# Patient Record
Sex: FEMALE | Race: BLACK OR AFRICAN AMERICAN | Employment: UNEMPLOYED | URBAN - METROPOLITAN AREA
[De-identification: names, ages, dates, MRNs, and addresses within clinical notes are randomized per-mention and may not be internally consistent; named-entity substitution may affect disease eponyms.]

---

## 2018-07-10 ENCOUNTER — HOSPITAL ENCOUNTER (OUTPATIENT)
Dept: NUTRITION | Age: 28
Discharge: HOME | End: 2018-07-10
Attending: INTERNAL MEDICINE
Payer: OTHER GOVERNMENT

## 2018-07-10 DIAGNOSIS — Z71.3 NUTRITIONAL COUNSELING: ICD-10-CM

## 2018-07-10 PROCEDURE — 97802 MEDICAL NUTRITION INDIV IN: CPT | Performed by: DIETITIAN, REGISTERED

## 2018-07-18 NOTE — PROGRESS NOTES
NUTRITION       Returned phone call from Dr. Bang Barrett; physician states GI series or gastric emptying study not appropriate for pt. Had offered pt mother Reglan, but mother concerned about potential side effects. Physician to contact mother about retrying overnight feeds via pump to ascertain tolerance before proceeding with possibility of G-J feeds.      Leslie Arnold RD

## 2024-03-01 NOTE — PROGRESS NOTES
[facility] Assessment - Initial Nutrition Evaluation   DATE: 7/10/2018      REFERRING PHYSICIAN: Dr. Helder Soto  NAME: Pippa Hawley : 1990 AGE: 33 y.o. GENDER: female  REASON FOR VISIT: Evaluation of Feeding tube     Past Medical Hx: Spinal cerebellar ataxia type II,       LABS:   No results found for: HBA1C, HGBE8, KZL5TPAD, ZXM4QACQ    MEDICATIONS/SUPPLEMENTS:   B Complex, Magnesium, Vit D3, Oregnaol P73    FOOD ALLERGIES/INTOLERANCES: None noted    ANTHROPOMETRICS:    Ht: 63\"  Wt: 100 lbs (45.5 kg)  IBW:115 # +/- 10%  %IBW: 87 % +/- 10%    BMI: 17.8 Category: Underweight    Reported Wt Hx:  Mother states pt was ~100 lbs 4 years ago; mother states has no access to wheelchair scale to weigh pt    Estimated Nutritional Needs:   8583-0583 kcal/day (MSJ x 1.2-1.3); 55-68g protein/day (1.2-1.5g/kg)    Reported Diet Hx:  Pt on PEG feeds exclusively since ; was on Ensure until 18 months ago and then switched to Fibersource HN, 6 cans daily recommended     Exercise/Physical Actvity:  None; pt wheelchair bound    Environmental/Social:  Pt non-verbal, wheelchair bound; lives with mother and father    NUTRITION DIAGNOSIS:  Altered GI function r/t spinal cerebellar ataxia as evidenced by tube feeds as sole source of nutrition    NUTRITION ASSESSMENT / INTERVENTION:  Pt seen for tube feeding management. Pt non-verbal, accompanied by mother. Mother states pt feed via PEG since  (along with oral intake); in  pt using PEG as sole source of nutrition due to aspiration risk. Pt prescribed 6 cans Fibersource HN/day (1800 kcal, 81g protein) but currentlhy only receiving 2-4 cans (600-1200 kcal, 26-54g protein meeting 43-86% caloric, 47-98% protein needs). Mother states pt sometimes receiving no formula due to coughing and choking; \"some days are worse than others\"- attempts to assist pt when choking by leaning pt forward and suctioning. Pt hospitalized last year due to aspiration pneumonia.  Mother states having tried overnight feeds in past but pt unable to tolerate due to too much movement causing tube to dislodge; have also tried infusing at slower rate but coughing/gagging still occur. Mother unsure of pt current wt due to unavailability of proper scale to weigh pt, but has noticed pt thinner in upper body and clothes are becoming loose. Current tube feeding regimen inadequate in calories to maintain wt and pt will likely continue to lose weight. Pt also very high risk of aspiration (likely currently aspirating). Mother also complains of pt constipation- has to assist pt with bowel movements; was prescribed bowel regimen but has had issues with insurance covering prescribed meds. Due to continued regurgitation of tube feeds would consider upper GI series to rule out obstruction and gastric emptying study for gastroparesis, and/or possible conversion to G-J tube. If G-J tube is pursued could use G tube for drainage. Recommend considering trial of Nutren 1.5, which is lower in fiber than current tube feeding (4 cans per day), in the event pt has delayed gastric emptying. This provides 1500 kcal, 68g protein meeting 100% estimated needs. In the event G-J tube if indicated, Nutren 1.5 could used through G-J tube. RD to contact GI to discuss next steps; will provide GI with RD note. If further details are desired please feel free to contact me at [phone]. This phone number was also provided to the patient for any further questions or concerns.             Ron Major RD negative